# Patient Record
Sex: MALE | Race: WHITE | NOT HISPANIC OR LATINO | Employment: FULL TIME
[De-identification: names, ages, dates, MRNs, and addresses within clinical notes are randomized per-mention and may not be internally consistent; named-entity substitution may affect disease eponyms.]

---

## 2021-01-04 ENCOUNTER — RESULTS ONLY (OUTPATIENT)
Dept: LAB | Age: 46
End: 2021-01-04

## 2021-01-04 LAB
LABORATORY COMMENT REPORT: ABNORMAL
SARS-COV-2 RNA SPEC QL NAA+PROBE: POSITIVE
SPECIMEN SOURCE: ABNORMAL

## 2021-12-29 ENCOUNTER — IMMUNIZATION (OUTPATIENT)
Dept: FAMILY MEDICINE | Facility: OTHER | Age: 46
End: 2021-12-29
Attending: FAMILY MEDICINE

## 2021-12-29 PROCEDURE — 0004A PR COVID VAC PFIZER DIL RECON 30 MCG/0.3 ML IM: CPT

## 2021-12-29 PROCEDURE — 91300 PR COVID VAC PFIZER DIL RECON 30 MCG/0.3 ML IM: CPT

## 2022-05-02 ENCOUNTER — HOSPITAL ENCOUNTER (EMERGENCY)
Facility: HOSPITAL | Age: 47
Discharge: HOME | End: 2022-05-02
Attending: NURSE PRACTITIONER | Admitting: NURSE PRACTITIONER

## 2022-05-02 ENCOUNTER — APPOINTMENT (OUTPATIENT)
Dept: GENERAL RADIOLOGY | Facility: HOSPITAL | Age: 47
End: 2022-05-02
Attending: STUDENT IN AN ORGANIZED HEALTH CARE EDUCATION/TRAINING PROGRAM

## 2022-05-02 VITALS
TEMPERATURE: 97.5 F | DIASTOLIC BLOOD PRESSURE: 93 MMHG | RESPIRATION RATE: 16 BRPM | OXYGEN SATURATION: 97 % | SYSTOLIC BLOOD PRESSURE: 169 MMHG | HEART RATE: 122 BPM

## 2022-05-02 DIAGNOSIS — S61.216A LACERATION OF RIGHT LITTLE FINGER WITHOUT FOREIGN BODY WITHOUT DAMAGE TO NAIL, INITIAL ENCOUNTER: ICD-10-CM

## 2022-05-02 PROCEDURE — 12001 RPR S/N/AX/GEN/TRNK 2.5CM/<: CPT | Performed by: NURSE PRACTITIONER

## 2022-05-02 PROCEDURE — 90715 TDAP VACCINE 7 YRS/> IM: CPT | Performed by: NURSE PRACTITIONER

## 2022-05-02 PROCEDURE — 999N000104 HC STATISTIC NO CHARGE

## 2022-05-02 PROCEDURE — 73140 X-RAY EXAM OF FINGER(S): CPT | Mod: RT

## 2022-05-02 PROCEDURE — 250N000011 HC RX IP 250 OP 636: Performed by: NURSE PRACTITIONER

## 2022-05-02 PROCEDURE — 90471 IMMUNIZATION ADMIN: CPT | Performed by: NURSE PRACTITIONER

## 2022-05-02 PROCEDURE — 12001 RPR S/N/AX/GEN/TRNK 2.5CM/<: CPT

## 2022-05-02 RX ADMIN — CLOSTRIDIUM TETANI TOXOID ANTIGEN (FORMALDEHYDE INACTIVATED), CORYNEBACTERIUM DIPHTHERIAE TOXOID ANTIGEN (FORMALDEHYDE INACTIVATED), BORDETELLA PERTUSSIS TOXOID ANTIGEN (GLUTARALDEHYDE INACTIVATED), BORDETELLA PERTUSSIS FILAMENTOUS HEMAGGLUTININ ANTIGEN (FORMALDEHYDE INACTIVATED), BORDETELLA PERTUSSIS PERTACTIN ANTIGEN, AND BORDETELLA PERTUSSIS FIMBRIAE 2/3 ANTIGEN 0.5 ML: 5; 2; 2.5; 5; 3; 5 INJECTION, SUSPENSION INTRAMUSCULAR at 11:57

## 2022-05-02 ASSESSMENT — ENCOUNTER SYMPTOMS
NUMBNESS: 1
WOUND: 1
NAUSEA: 0
COLOR CHANGE: 0
FEVER: 0
ACTIVITY CHANGE: 1
VOMITING: 0
CHILLS: 0

## 2022-05-02 NOTE — DISCHARGE INSTRUCTIONS
Apply bacitracin and do twice daily dressing changes for the next 48 to 72 hours. You may shower but do not saturate the wound. If you have increased pain, redness at wound site, fevers, or abnormal drainage (purulent/pus) you need to see your primary care provider or return to Urgent Care/ER immediately. Acetaminophen/tylenol  or ibuprofen for pain.  Suture removal in seven to ten days.

## 2022-05-02 NOTE — ED PROVIDER NOTES
History     Chief Complaint   Patient presents with     Laceration     HPI  Eris Jimenez is a 46 year old male who presents with a right little finger injury that occurred last evening when a truck could fell onto his middle finger.  Accompanied with numbness distal to laceration.  Right-hand-dominant.  No OTC medications have been taken or home interventions applied.  Denies previous injuries to that finger  Denies fevers, chills, nausea, and vomiting.    Musculoskeletal problem/pain  Six cutures      Duration: today    Description  Location: right little finger    Intensity:  2/10    Accompanying signs and symptoms: numbness    History  Previous similar problem: no   Previous evaluation:  none    Precipitating or alleviating factors:  Trauma or overuse: YES- sena of truck fell on finger. Was being held up by a stick   Aggravating factors include: hurt to bend    Therapies tried and outcome: nothing     Allergies:  No Known Allergies    Problem List:    There are no problems to display for this patient.       Past Medical History:    History reviewed. No pertinent past medical history.    Past Surgical History:    History reviewed. No pertinent surgical history.    Family History:    History reviewed. No pertinent family history.    Social History:  Marital Status:   [2]        Medications:    No current outpatient medications on file.        Review of Systems   Constitutional: Positive for activity change. Negative for chills and fever.   Gastrointestinal: Negative for nausea and vomiting.   Skin: Positive for wound (right little finger laceration). Negative for color change.   Neurological: Positive for numbness (distal to laceration).       Physical Exam   BP: 169/93  Pulse: (!) 122  Temp: 97.5  F (36.4  C)  Resp: 16  SpO2: 97 %      Physical Exam  Vitals and nursing note reviewed.   Constitutional:       General: He is in acute distress (mild).      Appearance: He is normal weight.    Cardiovascular:      Rate and Rhythm: Normal rate.   Pulmonary:      Effort: Pulmonary effort is normal.   Musculoskeletal:         General: Swelling (very mild), tenderness and signs of injury present.      Right hand: Swelling (very mild) and laceration present. No tenderness. Normal range of motion. Normal capillary refill. Normal pulse.        Hands:       Comments: right little finger     Skin:     General: Skin is warm and dry.      Capillary Refill: Capillary refill takes less than 2 seconds.      Findings: No bruising or erythema.   Neurological:      Mental Status: He is alert and oriented to person, place, and time.   Psychiatric:         Behavior: Behavior normal.         ED Course                 [facility]    -Laceration Repair    Date/Time: 5/3/2022 7:44 PM  Performed by: Mile Caal CNP  Authorized by: Mile Caal CNP     Risks, benefits and alternatives discussed.      ANESTHESIA (see MAR for exact dosages):     Anesthesia method:  Local infiltration    Local anesthetic:  Lidocaine 1% w/o epi (2 ml)  LACERATION DETAILS     Location:  Finger    Finger location:  R small finger    Length (cm):  1.5    Depth (mm):  2    REPAIR TYPE:     Repair type:  Simple      EXPLORATION:     Hemostasis achieved with:  Direct pressure    Wound exploration: wound explored through full range of motion and entire depth of wound probed and visualized      Wound extent: areolar tissue not violated, fascia not violated, no foreign body, no signs of injury, no nerve damage, no tendon damage, no underlying fracture and no vascular damage      Contaminated: no      TREATMENT:     Area cleansed with:  Saline    Amount of cleaning:  Standard    Irrigation solution:  Sterile saline    Irrigation volume:  10    Irrigation method:  Syringe    Visualized foreign bodies/material removed: no      SKIN REPAIR     Repair method:  Sutures    Suture size:  4-0    Suture material:  Nylon    Suture  technique:  Simple interrupted    Number of sutures:  6    APPROXIMATION     Approximation:  Close    POST-PROCEDURE DETAILS     Dressing:  Antibiotic ointment and non-adherent dressing        PROCEDURE  Describe Procedure: Wound soaked in Hibiclens and water. Injected with 2 ml of 1% lidocaine without epi. Irrigated with 10 ml of NS.  six sutures of 4.0 nylon placed in 1.5 cm V-shaped x 2 mm x 2 mm laceration. Triple antibiotic and dressing applied. Neurovascular status intact before and after procedure.               No results found for this or any previous visit (from the past 24 hour(s)).    Medications   Tdap (tetanus-diphtheria-acell pertussis) (ADACEL) injection 0.5 mL (0.5 mLs Intramuscular Given 5/2/22 6317)       Assessments & Plan (with Medical Decision Making)     I have reviewed the nursing notes.    I have reviewed the findings, diagnosis, plan and need for follow up with the patient.  (S6.060X) Laceration of right little finger without foreign body without damage to nail, initial encounter  Comment: 46 year old male who presents with a right little finger injury that occurred last evening when a truck could fell onto his middle finger.  Accompanied with numbness distal to laceration.  Right-hand-dominant.  No OTC medications have been taken or home interventions applied.  Denies previous injuries to that finger  Denies fevers, chills, nausea, and vomiting.    MDM:1.5 cm V shaped laceration by 2 mm x 2 mm.  Able to completely bend his little finger.    Right little finger x-ray reviewed and per radiology; normal right fifth finger    See procedure note for repair    Plan: Education provided for laceration.  Apply bacitracin and do twice daily dressing changes for the next 48 to 72 hours. You may shower but do not saturate the wound. If you have increased pain, redness at wound site, fevers, or abnormal drainage (purulent/pus) you need to see your primary care provider or return to Urgent Care/ER  immediately. Acetaminophen/tylenol  or ibuprofen for pain.  Suture removal in seven to ten days.   These discharge instructions and medications were reviewed with him and understanding verbalized.    This document was prepared using a combination of typing and voice generated software.  While every attempt was made for accuracy, spelling and grammatical errors may exist.    There are no discharge medications for this patient.      Final diagnoses:   Laceration of right little finger without foreign body without damage to nail, initial encounter       5/2/2022   [facility]       Mile Caal, CNP  05/03/22 2004

## 2022-05-02 NOTE — ED TRIAGE NOTES
"Patient presents with an injury to his right little finger.  States last night a truck sena slammed shut on his finger.  There is a \"v\" shaped laceration present to the medial aspect of the finger; middle joint.  CMS intact, bleeding controlled.        "

## 2023-01-31 ENCOUNTER — OFFICE VISIT (OUTPATIENT)
Dept: INTERNAL MEDICINE | Facility: OTHER | Age: 48
End: 2023-01-31
Attending: STUDENT IN AN ORGANIZED HEALTH CARE EDUCATION/TRAINING PROGRAM
Payer: COMMERCIAL

## 2023-01-31 ENCOUNTER — LAB (OUTPATIENT)
Dept: INTERNAL MEDICINE | Facility: OTHER | Age: 48
End: 2023-01-31

## 2023-01-31 VITALS
RESPIRATION RATE: 16 BRPM | WEIGHT: 187.4 LBS | HEART RATE: 81 BPM | HEIGHT: 68 IN | DIASTOLIC BLOOD PRESSURE: 72 MMHG | BODY MASS INDEX: 28.4 KG/M2 | TEMPERATURE: 97.8 F | OXYGEN SATURATION: 97 % | SYSTOLIC BLOOD PRESSURE: 124 MMHG

## 2023-01-31 DIAGNOSIS — Z00.00 ANNUAL PHYSICAL EXAM: Primary | ICD-10-CM

## 2023-01-31 DIAGNOSIS — I10 PRIMARY HYPERTENSION: ICD-10-CM

## 2023-01-31 DIAGNOSIS — Z12.11 SPECIAL SCREENING FOR MALIGNANT NEOPLASMS, COLON: ICD-10-CM

## 2023-01-31 DIAGNOSIS — E78.2 MIXED HYPERLIPIDEMIA: ICD-10-CM

## 2023-01-31 DIAGNOSIS — Z11.4 SCREENING FOR HIV WITHOUT PRESENCE OF RISK FACTORS: ICD-10-CM

## 2023-01-31 DIAGNOSIS — H90.3 ASYMMETRICAL SENSORINEURAL HEARING LOSS: ICD-10-CM

## 2023-01-31 DIAGNOSIS — Z11.59 NEED FOR HEPATITIS C SCREENING TEST: ICD-10-CM

## 2023-01-31 DIAGNOSIS — Z12.5 ENCOUNTER FOR SCREENING FOR MALIGNANT NEOPLASM OF PROSTATE: ICD-10-CM

## 2023-01-31 PROBLEM — R74.8 LOW SERUM HDL: Status: ACTIVE | Noted: 2023-01-31

## 2023-01-31 LAB
ALBUMIN SERPL BCG-MCNC: 4.7 G/DL (ref 3.5–5.2)
ALP SERPL-CCNC: 62 U/L (ref 40–129)
ALT SERPL W P-5'-P-CCNC: 72 U/L (ref 10–50)
ANION GAP SERPL CALCULATED.3IONS-SCNC: 10 MMOL/L (ref 7–15)
AST SERPL W P-5'-P-CCNC: 40 U/L (ref 10–50)
BILIRUB SERPL-MCNC: 0.6 MG/DL
BUN SERPL-MCNC: 16.3 MG/DL (ref 6–20)
CALCIUM SERPL-MCNC: 9.3 MG/DL (ref 8.6–10)
CHLORIDE SERPL-SCNC: 104 MMOL/L (ref 98–107)
CHOLEST SERPL-MCNC: 165 MG/DL
CREAT SERPL-MCNC: 1.01 MG/DL (ref 0.67–1.17)
DEPRECATED HCO3 PLAS-SCNC: 25 MMOL/L (ref 22–29)
ERYTHROCYTE [DISTWIDTH] IN BLOOD BY AUTOMATED COUNT: 11.8 % (ref 10–15)
GFR SERPL CREATININE-BSD FRML MDRD: >90 ML/MIN/1.73M2
GLUCOSE SERPL-MCNC: 99 MG/DL (ref 70–99)
HCT VFR BLD AUTO: 45.4 % (ref 40–53)
HDLC SERPL-MCNC: 36 MG/DL
HGB BLD-MCNC: 15.9 G/DL (ref 13.3–17.7)
LDLC SERPL CALC-MCNC: 93 MG/DL
MCH RBC QN AUTO: 30.3 PG (ref 26.5–33)
MCHC RBC AUTO-ENTMCNC: 35 G/DL (ref 31.5–36.5)
MCV RBC AUTO: 87 FL (ref 78–100)
NONHDLC SERPL-MCNC: 129 MG/DL
PLATELET # BLD AUTO: 287 10E3/UL (ref 150–450)
POTASSIUM SERPL-SCNC: 4.5 MMOL/L (ref 3.4–5.3)
PROT SERPL-MCNC: 7.2 G/DL (ref 6.4–8.3)
PSA SERPL-MCNC: 0.56 NG/ML (ref 0–2.5)
RBC # BLD AUTO: 5.24 10E6/UL (ref 4.4–5.9)
SODIUM SERPL-SCNC: 139 MMOL/L (ref 136–145)
TRIGL SERPL-MCNC: 178 MG/DL
WBC # BLD AUTO: 8.4 10E3/UL (ref 4–11)

## 2023-01-31 PROCEDURE — 99386 PREV VISIT NEW AGE 40-64: CPT | Performed by: STUDENT IN AN ORGANIZED HEALTH CARE EDUCATION/TRAINING PROGRAM

## 2023-01-31 PROCEDURE — 36415 COLL VENOUS BLD VENIPUNCTURE: CPT | Mod: ZL | Performed by: STUDENT IN AN ORGANIZED HEALTH CARE EDUCATION/TRAINING PROGRAM

## 2023-01-31 PROCEDURE — 86803 HEPATITIS C AB TEST: CPT | Mod: ZL | Performed by: STUDENT IN AN ORGANIZED HEALTH CARE EDUCATION/TRAINING PROGRAM

## 2023-01-31 PROCEDURE — 85027 COMPLETE CBC AUTOMATED: CPT | Mod: ZL | Performed by: STUDENT IN AN ORGANIZED HEALTH CARE EDUCATION/TRAINING PROGRAM

## 2023-01-31 PROCEDURE — 80061 LIPID PANEL: CPT | Mod: ZL | Performed by: STUDENT IN AN ORGANIZED HEALTH CARE EDUCATION/TRAINING PROGRAM

## 2023-01-31 PROCEDURE — G0103 PSA SCREENING: HCPCS | Mod: ZL | Performed by: STUDENT IN AN ORGANIZED HEALTH CARE EDUCATION/TRAINING PROGRAM

## 2023-01-31 PROCEDURE — 87389 HIV-1 AG W/HIV-1&-2 AB AG IA: CPT | Mod: ZL | Performed by: STUDENT IN AN ORGANIZED HEALTH CARE EDUCATION/TRAINING PROGRAM

## 2023-01-31 PROCEDURE — 80053 COMPREHEN METABOLIC PANEL: CPT | Mod: ZL | Performed by: STUDENT IN AN ORGANIZED HEALTH CARE EDUCATION/TRAINING PROGRAM

## 2023-01-31 RX ORDER — ROSUVASTATIN CALCIUM 10 MG/1
1 TABLET, COATED ORAL
COMMUNITY
Start: 2023-01-08 | End: 2023-01-31

## 2023-01-31 RX ORDER — ROSUVASTATIN CALCIUM 10 MG/1
10 TABLET, COATED ORAL
Qty: 90 TABLET | Refills: 3 | Status: SHIPPED | OUTPATIENT
Start: 2023-01-31 | End: 2024-01-24

## 2023-01-31 RX ORDER — ROSUVASTATIN CALCIUM 10 MG/1
TABLET, COATED ORAL
COMMUNITY
Start: 2022-09-21 | End: 2023-01-31

## 2023-01-31 RX ORDER — LISINOPRIL 10 MG/1
10 TABLET ORAL
Qty: 90 TABLET | Refills: 4 | Status: SHIPPED | OUTPATIENT
Start: 2023-01-31 | End: 2024-01-24

## 2023-01-31 RX ORDER — LISINOPRIL 10 MG/1
TABLET ORAL
COMMUNITY
Start: 2022-09-21 | End: 2023-01-31

## 2023-01-31 RX ORDER — PENICILLIN V POTASSIUM 500 MG/1
TABLET, FILM COATED ORAL
COMMUNITY
Start: 2022-03-29 | End: 2023-01-31

## 2023-01-31 RX ORDER — LISINOPRIL 10 MG/1
1 TABLET ORAL
COMMUNITY
Start: 2023-01-08 | End: 2023-01-31

## 2023-01-31 ASSESSMENT — ENCOUNTER SYMPTOMS
WEAKNESS: 0
HEARTBURN: 0
PARESTHESIAS: 0
SORE THROAT: 0
PALPITATIONS: 0
FEVER: 0
CHILLS: 0
HEMATOCHEZIA: 0
DYSURIA: 0
DIZZINESS: 0
NERVOUS/ANXIOUS: 0
JOINT SWELLING: 0
FREQUENCY: 0
NAUSEA: 0
HEMATURIA: 0
ARTHRALGIAS: 0
SHORTNESS OF BREATH: 0
ABDOMINAL PAIN: 0
CONSTIPATION: 0
MYALGIAS: 0
EYE PAIN: 0
HEADACHES: 0
DIARRHEA: 0
COUGH: 0

## 2023-01-31 ASSESSMENT — PAIN SCALES - GENERAL: PAINLEVEL: NO PAIN (0)

## 2023-01-31 NOTE — LETTER
January 31, 2023      AMBROSE Jimenez   [address]  [address] [place] [address]        Dear ,      We are writing to inform you of your test results.    It was great to see you today. Your lab results are as below. You screened negative for prostate cancer, your CMP was remarkable for a minimally isolated ALT. I don't know what to make of that, but we will recheck it in one year at your physical. Your blood counts are normal and your Lipid panel looks pretty good. No changes to your crestor. See you in a year, sooner if issues. Let me know if you have any questions.     Resulted Orders   PSA Screen GH   Result Value Ref Range    Prostate Specific Antigen Screen 0.56 0.00 - 2.50 ng/mL    Narrative    This result is obtained using the Roche Elecsys total PSA method on the josé manuel e601 immunoassay analyzer. Results obtained with different assay methods or kits cannot be used interchangeably.   Comprehensive Metabolic Panel   Result Value Ref Range    Sodium 139 136 - 145 mmol/L    Potassium 4.5 3.4 - 5.3 mmol/L    Chloride 104 98 - 107 mmol/L    Carbon Dioxide (CO2) 25 22 - 29 mmol/L    Anion Gap 10 7 - 15 mmol/L    Urea Nitrogen 16.3 6.0 - 20.0 mg/dL    Creatinine 1.01 0.67 - 1.17 mg/dL    Calcium 9.3 8.6 - 10.0 mg/dL    Glucose 99 70 - 99 mg/dL    Alkaline Phosphatase 62 40 - 129 U/L    AST 40 10 - 50 U/L    ALT 72 (H) 10 - 50 U/L    Protein Total 7.2 6.4 - 8.3 g/dL    Albumin 4.7 3.5 - 5.2 g/dL    Bilirubin Total 0.6 <=1.2 mg/dL    GFR Estimate >90 >60 mL/min/1.73m2      Comment:      eGFR calculated using 2021 CKD-EPI equation.   CBC W PLT No Diff   Result Value Ref Range    WBC Count 8.4 4.0 - 11.0 10e3/uL    RBC Count 5.24 4.40 - 5.90 10e6/uL    Hemoglobin 15.9 13.3 - 17.7 g/dL    Hematocrit 45.4 40.0 - 53.0 %    MCV 87 78 - 100 fL    MCH 30.3 26.5 - 33.0 pg    MCHC 35.0 31.5 - 36.5 g/dL    RDW 11.8 10.0 - 15.0 %    Platelet Count 287 150 - 450 10e3/uL   Lipid Panel   Result Value Ref Range    Cholesterol  165 <200 mg/dL    Triglycerides 178 (H) <150 mg/dL    Direct Measure HDL 36 (L) >=40 mg/dL    LDL Cholesterol Calculated 93 <=100 mg/dL    Non HDL Cholesterol 129 <130 mg/dL    Narrative    Cholesterol  Desirable:  <200 mg/dL    Triglycerides  Normal:  Less than 150 mg/dL  Borderline High:  150-199 mg/dL  High:  200-499 mg/dL  Very High:  Greater than or equal to 500 mg/dL    Direct Measure HDL  Female:  Greater than or equal to 50 mg/dL   Male:  Greater than or equal to 40 mg/dL    LDL Cholesterol  Desirable:  <100mg/dL  Above Desirable:  100-129 mg/dL   Borderline High:  130-159 mg/dL   High:  160-189 mg/dL   Very High:  >= 190 mg/dL    Non HDL Cholesterol  Desirable:  130 mg/dL  Above Desirable:  130-159 mg/dL  Borderline High:  160-189 mg/dL  High:  190-219 mg/dL  Very High:  Greater than or equal to 220 mg/dL       If you have any questions or concerns, please call the clinic at the number listed above.       Sincerely,      Robert He MD

## 2023-01-31 NOTE — PROGRESS NOTES
SUBJECTIVE:   CC: AMBROSE is an 47 year old who presents for preventative health visit.   Patient has been advised of split billing requirements and indicates understanding: Yes  Healthy Habits:     Getting at least 3 servings of Calcium per day:  Yes    Bi-annual eye exam:  NO    Dental care twice a year:  Yes    Sleep apnea or symptoms of sleep apnea:  None    Diet:  Regular (no restrictions)    Frequency of exercise:  2-3 days/week    Duration of exercise:  45-60 minutes    Taking medications regularly:  Yes    Medication side effects:  None    PHQ-2 Total Score: 0    Additional concerns today:  No      No concerns today he is establishing care from [facility] in [place].  His former primary care doctor is retiring.  He is essentially on rosuvastatin and lisinopril.  Recently started on lisinopril due to diastolic hypertension.  No complaints today.  Doing well.  He is exercising at the gym approximately 4 times per week.  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  Updated past medical history surgical history and social history.        Today's PHQ-2 Score:   PHQ-2 ( 1999 Pfizer) 1/31/2023   Q1: Little interest or pleasure in doing things 0   Q2: Feeling down, depressed or hopeless 0   PHQ-2 Score 0   Q1: Little interest or pleasure in doing things Not at all   Q2: Feeling down, depressed or hopeless Not at all   PHQ-2 Score 0       Have you ever done Advance Care Planning? (For example, a Health Directive, POLST, or a discussion with a medical provider or your loved ones about your wishes): No, advance care planning information given to patient to review.  Patient declined advance care planning discussion at this time.    Social History     Tobacco Use     Smoking status: Never     Smokeless tobacco: Never   Substance Use Topics     Alcohol use: Yes     Comment: very rarely     If you drink alcohol do you typically have >3 drinks per day or >7 drinks per week? No    Alcohol Use 1/31/2023  "  Prescreen: >3 drinks/day or >7 drinks/week? No       Last PSA: No results found for: PSA    Reviewed orders with patient. Reviewed health maintenance and updated orders accordingly - Yes  Lab work is in process    Reviewed and updated as needed this visit by clinical staff   Tobacco  Allergies  Meds      Soc Hx        Reviewed and updated as needed this visit by Provider                 Past Medical History:   Diagnosis Date     HTN (hypertension)       Past Surgical History:   Procedure Laterality Date     wisdom teeth      Age 19       Review of Systems  CONSTITUTIONAL: NEGATIVE for fever, chills, change in weight  INTEGUMENTARY/SKIN: NEGATIVE for worrisome rashes, moles or lesions  EYES: NEGATIVE for vision changes or irritation  ENT: NEGATIVE for ear, mouth and throat problems  RESP: NEGATIVE for significant cough or SOB  CV: NEGATIVE for chest pain, palpitations or peripheral edema  GI: NEGATIVE for nausea, abdominal pain, heartburn, or change in bowel habits   male: negative for dysuria, hematuria, decreased urinary stream, erectile dysfunction, urethral discharge  MUSCULOSKELETAL: NEGATIVE for significant arthralgias or myalgia  NEURO: NEGATIVE for weakness, dizziness or paresthesias  PSYCHIATRIC: NEGATIVE for changes in mood or affect    OBJECTIVE:   /72 (BP Location: Right arm, Patient Position: Sitting, Cuff Size: Adult Regular)   Pulse 81   Temp 97.8  F (36.6  C) (Temporal)   Resp 16   Ht 1.727 m (5' 8\")   Wt 85 kg (187 lb 6.4 oz)   SpO2 97%   BMI 28.49 kg/m      Physical Exam  Vitals and nursing note reviewed.   Constitutional:       General: He is not in acute distress.     Appearance: He is well-developed. He is not diaphoretic.   HENT:      Head: Normocephalic and atraumatic.      Right Ear: Tympanic membrane, ear canal and external ear normal.      Left Ear: Tympanic membrane, ear canal and external ear normal.      Mouth/Throat:      Mouth: Mucous membranes are moist.      " Pharynx: Oropharynx is clear.   Eyes:      General: No scleral icterus.     Conjunctiva/sclera: Conjunctivae normal.   Cardiovascular:      Rate and Rhythm: Normal rate and regular rhythm.      Heart sounds: No murmur heard.  Pulmonary:      Effort: Pulmonary effort is normal.      Breath sounds: Normal breath sounds. No wheezing.   Abdominal:      Palpations: Abdomen is soft. There is no mass.      Tenderness: There is no abdominal tenderness.   Musculoskeletal:         General: No deformity.      Cervical back: Neck supple.   Lymphadenopathy:      Cervical: No cervical adenopathy.   Skin:     General: Skin is warm and dry.      Coloration: Skin is not jaundiced.      Findings: No rash.   Neurological:      Mental Status: He is alert and oriented to person, place, and time. Mental status is at baseline.   Psychiatric:         Mood and Affect: Mood normal.         Behavior: Behavior normal.         Thought Content: Thought content normal.           Diagnostic Test Results:  Labs reviewed in Epic    ASSESSMENT/PLAN:       ICD-10-CM    1. Annual physical exam  Z00.00 Comprehensive Metabolic Panel     CBC W PLT No Diff     Comprehensive Metabolic Panel     CBC W PLT No Diff      2. Special screening for malignant neoplasms, colon  Z12.11 COLOGUARD(EXACT SCIENCES)      3. Primary hypertension  I10 lisinopril (ZESTRIL) 10 MG tablet      4. Mixed hyperlipidemia  E78.2 rosuvastatin (CRESTOR) 10 MG tablet     Lipid Panel     Lipid Panel      5. Asymmetrical sensorineural hearing loss  H90.3       6. Encounter for screening for malignant neoplasm of prostate  Z12.5 PSA Screen GH     PSA Screen GH      7. Screening for HIV without presence of risk factors  Z11.4 HIV Antigen Antibody Combo     HIV Antigen Antibody Combo      8. Need for hepatitis C screening test  Z11.59 Hepatitis C Screen Reflex to HCV RNA Quant and Genotype     Hepatitis C Screen Reflex to HCV RNA Quant and Genotype        Annual physical exam: Patient is  "establishing care from [facility] in [place].  Reviewed and updated his past medical history surgical history and social history.  He is on rosuvastatin and lisinopril and I have represcribed both of these medications for him for his hypertension and well-controlled hyperlipidemia.  He has not had a colonoscopy before.  We discussed obtaining a colonoscopy at age 50 and performing a Cologuard testing this year.  We will obtain basic labs as he has recently started his lisinopril to ensure he does not have hyperkalemia.  Also screen one-time for PSA HIV and hepatitis C.  Blood pressure under good control today.  Follow-up in 1 year for repeat annual exam.  Up-to-date on all vaccinations.        COUNSELING:   Reviewed preventive health counseling, as reflected in patient instructions       Regular exercise       Consider Hep C screening for all patients one time for ages 18-79 years       HIV screeninx in teen years, 1x in adult years, and at intervals if high risk       Colorectal cancer screening       Prostate cancer screening      BMI:   Estimated body mass index is 28.49 kg/m  as calculated from the following:    Height as of this encounter: 1.727 m (5' 8\").    Weight as of this encounter: 85 kg (187 lb 6.4 oz).   Weight management plan: Discussed healthy diet and exercise guidelines      He reports that he has never smoked. He has never used smokeless tobacco.        Robert He MD  [facility]  "

## 2023-01-31 NOTE — NURSING NOTE
"Chief Complaint   Patient presents with     Physical     Blood pressure and est care       Medication reconciliation completed.    FOOD SECURITY SCREENING QUESTIONS:    The next two questions are to help us understand your food security.  If you are feeling you need any assistance in this area, we have resources available to support you today.    Hunger Vital Signs:  Within the past 12 months we worried whether our food would run out before we got money to buy more. Never  Within the past 12 months the food we bought just didn't last and we didn't have money to get more. Never    Initial /72 (BP Location: Right arm, Patient Position: Sitting, Cuff Size: Adult Regular)   Pulse 81   Temp 97.8  F (36.6  C) (Temporal)   Resp 16   Ht 1.727 m (5' 8\")   Wt 85 kg (187 lb 6.4 oz)   SpO2 97%   BMI 28.49 kg/m   Estimated body mass index is 28.49 kg/m  as calculated from the following:    Height as of this encounter: 1.727 m (5' 8\").    Weight as of this encounter: 85 kg (187 lb 6.4 oz).       Anca Gaxiola LPN .......  1/31/2023  1:32 PM  "

## 2023-02-02 LAB
HCV AB SERPL QL IA: NONREACTIVE
HIV 1+2 AB+HIV1 P24 AG SERPL QL IA: NONREACTIVE

## 2023-02-15 LAB — NONINV COLON CA DNA+OCC BLD SCRN STL QL: NEGATIVE

## 2024-01-24 ENCOUNTER — OFFICE VISIT (OUTPATIENT)
Dept: INTERNAL MEDICINE | Facility: OTHER | Age: 49
End: 2024-01-24
Attending: STUDENT IN AN ORGANIZED HEALTH CARE EDUCATION/TRAINING PROGRAM
Payer: COMMERCIAL

## 2024-01-24 VITALS
OXYGEN SATURATION: 98 % | HEIGHT: 68 IN | WEIGHT: 192.2 LBS | RESPIRATION RATE: 16 BRPM | SYSTOLIC BLOOD PRESSURE: 122 MMHG | BODY MASS INDEX: 29.13 KG/M2 | DIASTOLIC BLOOD PRESSURE: 86 MMHG | TEMPERATURE: 97.9 F | HEART RATE: 77 BPM

## 2024-01-24 DIAGNOSIS — F43.21 GRIEF: ICD-10-CM

## 2024-01-24 DIAGNOSIS — I10 PRIMARY HYPERTENSION: ICD-10-CM

## 2024-01-24 DIAGNOSIS — E78.2 MIXED HYPERLIPIDEMIA: ICD-10-CM

## 2024-01-24 DIAGNOSIS — Z12.5 ENCOUNTER FOR SCREENING FOR MALIGNANT NEOPLASM OF PROSTATE: ICD-10-CM

## 2024-01-24 DIAGNOSIS — R79.89 ABNORMAL LFTS: ICD-10-CM

## 2024-01-24 DIAGNOSIS — Z00.00 ANNUAL PHYSICAL EXAM: Primary | ICD-10-CM

## 2024-01-24 LAB
ALBUMIN SERPL BCG-MCNC: 4.8 G/DL (ref 3.5–5.2)
ALP SERPL-CCNC: 61 U/L (ref 40–150)
ALT SERPL W P-5'-P-CCNC: 57 U/L (ref 0–70)
ANION GAP SERPL CALCULATED.3IONS-SCNC: 9 MMOL/L (ref 7–15)
AST SERPL W P-5'-P-CCNC: 37 U/L (ref 0–45)
BILIRUB SERPL-MCNC: 0.4 MG/DL
BUN SERPL-MCNC: 12.3 MG/DL (ref 6–20)
CALCIUM SERPL-MCNC: 9.5 MG/DL (ref 8.6–10)
CHLORIDE SERPL-SCNC: 105 MMOL/L (ref 98–107)
CHOLEST SERPL-MCNC: 171 MG/DL
CREAT SERPL-MCNC: 1.04 MG/DL (ref 0.67–1.17)
DEPRECATED HCO3 PLAS-SCNC: 28 MMOL/L (ref 22–29)
EGFRCR SERPLBLD CKD-EPI 2021: 89 ML/MIN/1.73M2
ERYTHROCYTE [DISTWIDTH] IN BLOOD BY AUTOMATED COUNT: 12 % (ref 10–15)
FASTING STATUS PATIENT QL REPORTED: YES
GLUCOSE SERPL-MCNC: 97 MG/DL (ref 70–99)
HCT VFR BLD AUTO: 44.6 % (ref 40–53)
HDLC SERPL-MCNC: 34 MG/DL
HGB BLD-MCNC: 15.3 G/DL (ref 13.3–17.7)
LDLC SERPL CALC-MCNC: 91 MG/DL
MCH RBC QN AUTO: 29.8 PG (ref 26.5–33)
MCHC RBC AUTO-ENTMCNC: 34.3 G/DL (ref 31.5–36.5)
MCV RBC AUTO: 87 FL (ref 78–100)
NONHDLC SERPL-MCNC: 137 MG/DL
PLATELET # BLD AUTO: 300 10E3/UL (ref 150–450)
POTASSIUM SERPL-SCNC: 4.5 MMOL/L (ref 3.4–5.3)
PROT SERPL-MCNC: 7.3 G/DL (ref 6.4–8.3)
PSA SERPL DL<=0.01 NG/ML-MCNC: 0.51 NG/ML (ref 0–2.5)
RBC # BLD AUTO: 5.13 10E6/UL (ref 4.4–5.9)
SODIUM SERPL-SCNC: 142 MMOL/L (ref 135–145)
TRIGL SERPL-MCNC: 228 MG/DL
WBC # BLD AUTO: 9.8 10E3/UL (ref 4–11)

## 2024-01-24 PROCEDURE — G0103 PSA SCREENING: HCPCS | Mod: ZL | Performed by: STUDENT IN AN ORGANIZED HEALTH CARE EDUCATION/TRAINING PROGRAM

## 2024-01-24 PROCEDURE — 80053 COMPREHEN METABOLIC PANEL: CPT | Mod: ZL | Performed by: STUDENT IN AN ORGANIZED HEALTH CARE EDUCATION/TRAINING PROGRAM

## 2024-01-24 PROCEDURE — 99396 PREV VISIT EST AGE 40-64: CPT | Performed by: STUDENT IN AN ORGANIZED HEALTH CARE EDUCATION/TRAINING PROGRAM

## 2024-01-24 PROCEDURE — 36415 COLL VENOUS BLD VENIPUNCTURE: CPT | Mod: ZL | Performed by: STUDENT IN AN ORGANIZED HEALTH CARE EDUCATION/TRAINING PROGRAM

## 2024-01-24 PROCEDURE — 80061 LIPID PANEL: CPT | Mod: ZL | Performed by: STUDENT IN AN ORGANIZED HEALTH CARE EDUCATION/TRAINING PROGRAM

## 2024-01-24 PROCEDURE — 85014 HEMATOCRIT: CPT | Mod: ZL | Performed by: STUDENT IN AN ORGANIZED HEALTH CARE EDUCATION/TRAINING PROGRAM

## 2024-01-24 RX ORDER — LISINOPRIL 10 MG/1
10 TABLET ORAL
Qty: 90 TABLET | Refills: 4 | Status: SHIPPED | OUTPATIENT
Start: 2024-01-24

## 2024-01-24 RX ORDER — ROSUVASTATIN CALCIUM 10 MG/1
10 TABLET, COATED ORAL
Qty: 90 TABLET | Refills: 4 | Status: SHIPPED | OUTPATIENT
Start: 2024-01-24

## 2024-01-24 ASSESSMENT — ENCOUNTER SYMPTOMS
HEMATOCHEZIA: 0
NAUSEA: 0
PARESTHESIAS: 0
WEAKNESS: 0
SHORTNESS OF BREATH: 0
ABDOMINAL PAIN: 0
HEADACHES: 0
CHILLS: 0
PALPITATIONS: 0
HEMATURIA: 0
DIARRHEA: 0
DIZZINESS: 0
MYALGIAS: 0
CONSTIPATION: 0
EYE PAIN: 0
NERVOUS/ANXIOUS: 0
DYSURIA: 0
JOINT SWELLING: 0
FEVER: 0
HEARTBURN: 0
SORE THROAT: 0
ARTHRALGIAS: 0
FREQUENCY: 0
COUGH: 0

## 2024-01-24 ASSESSMENT — PAIN SCALES - GENERAL: PAINLEVEL: NO PAIN (0)

## 2024-01-24 NOTE — PROGRESS NOTES
Preventive Care Visit  [facility] AND [facility]  Robert He MD, Internal Medicine  Jan 24, 2024      SUBJECTIVE:   AMBROSE is a 48 year old, presenting for the following:  Physical        1/24/2024     7:55 AM   Additional Questions   Roomed by Connor Johnson LPN     Healthy Habits:     Getting at least 3 servings of Calcium per day:  Yes    Bi-annual eye exam:  NO    Dental care twice a year:  Yes    Sleep apnea or symptoms of sleep apnea:  None    Diet:  Regular (no restrictions)    Frequency of exercise:  2-3 days/week    Duration of exercise:  45-60 minutes    Taking medications regularly:  Yes    Medication side effects:  None    Additional concerns today:  No      Today's PHQ-2 Score:       1/24/2024    12:26 AM   PHQ-2 ( 1999 Pfizer)   Q1: Little interest or pleasure in doing things 0   Q2: Feeling down, depressed or hopeless 0   PHQ-2 Score 0   Q1: Little interest or pleasure in doing things Not at all   Q2: Feeling down, depressed or hopeless Not at all   PHQ-2 Score 0     Updated past medical history surgical history social medications.    Patient unfortunately lost his son this summer.  He is coping relatively well.  Not feeling down or depressed but showing normal emotions associated with grief.  Recently traveled to [place] with his other son and girlfriend and his wife.  He is still exercising 5 days a week.  Very rare alcohol use.  No other substance abuse.  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  He finally got hearing aids.  He still suffers from tinnitus.  We discussed that there is essentially no treatment for this and that ENT will not offer anything more to him.  I did offer referral to neuro-otology at the [facility] for further evaluation but he declined at this time.    He has been taking his rosuvastatin and lisinopril regularly.  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    No other concerns today.      Social History     Tobacco Use    Smoking status: Never    Smokeless tobacco: Never   Substance Use Topics    Alcohol use: Not Currently     Comment: very rarely             1/24/2024    12:24 AM   Alcohol Use   Prescreen: >3 drinks/day or >7 drinks/week? No       Last PSA:   Prostate Specific Antigen Screen   Date Value Ref Range Status   01/31/2023 0.56 0.00 - 2.50 ng/mL Final       Reviewed orders with patient. Reviewed health maintenance and updated orders accordingly - Yes  Lab work is in process  Labs reviewed in EPIC    Reviewed and updated as needed this visit by clinical staff   Tobacco  Allergies  Meds      Soc Hx        Reviewed and updated as needed this visit by Provider                    Review of Systems   Constitutional:  Negative for chills and fever.   HENT:  Positive for hearing loss. Negative for congestion, ear pain and sore throat.    Eyes:  Negative for pain and visual disturbance.   Respiratory:  Negative for cough and shortness of breath.    Cardiovascular:  Negative for chest pain and palpitations.   Gastrointestinal:  Negative for abdominal pain, constipation, diarrhea and nausea.   Genitourinary:  Negative for dysuria, frequency, genital sores, hematuria, penile discharge and urgency.   Musculoskeletal:  Negative for arthralgias, joint swelling and myalgias.   Skin:  Negative for rash.   Neurological:  Negative for dizziness, weakness and headaches.   Psychiatric/Behavioral:  The patient is not nervous/anxious.          OBJECTIVE:   /86   Pulse 77   Temp 97.9  F (36.6  C) (Temporal)   Resp 16   Ht 1.727 m (5' 8\")   Wt 87.2 kg (192 lb 3.2 oz)   SpO2 98%   BMI 29.22 kg/m     Estimated body mass index is 29.22 kg/m  as calculated from the following:    Height as of this encounter: 1.727 m (5' 8\").    Weight as of this encounter: 87.2 kg (192 lb 3.2 oz).  Physical Exam  Vitals and nursing note reviewed.   Constitutional:       General: He " is not in acute distress.     Appearance: He is well-developed. He is not diaphoretic.   HENT:      Head: Normocephalic and atraumatic.      Right Ear: Tympanic membrane, ear canal and external ear normal.      Left Ear: Tympanic membrane, ear canal and external ear normal.      Mouth/Throat:      Mouth: Mucous membranes are moist.      Pharynx: Oropharynx is clear.   Eyes:      General: No scleral icterus.     Conjunctiva/sclera: Conjunctivae normal.   Cardiovascular:      Rate and Rhythm: Normal rate and regular rhythm.      Heart sounds: No murmur heard.  Pulmonary:      Effort: Pulmonary effort is normal.      Breath sounds: Normal breath sounds. No wheezing.   Abdominal:      Palpations: Abdomen is soft. There is no mass.      Tenderness: There is no abdominal tenderness.   Musculoskeletal:         General: No deformity.      Cervical back: Neck supple.   Lymphadenopathy:      Cervical: No cervical adenopathy.   Skin:     General: Skin is warm and dry.      Coloration: Skin is not jaundiced.      Findings: No rash.   Neurological:      Mental Status: He is alert and oriented to person, place, and time. Mental status is at baseline.   Psychiatric:         Mood and Affect: Mood normal.         Behavior: Behavior normal.         Thought Content: Thought content normal.           Diagnostic Test Results:  Labs reviewed in Epic    ASSESSMENT/PLAN:   Assessment & Plan         ICD-10-CM    1. Annual physical exam  Z00.00 CBC with platelets     Comprehensive metabolic panel     CBC with platelets     Comprehensive metabolic panel      2. Primary hypertension  I10 lisinopril (ZESTRIL) 10 MG tablet      3. Mixed hyperlipidemia  E78.2 rosuvastatin (CRESTOR) 10 MG tablet     Lipid Profile     Lipid Profile      4. Encounter for screening for malignant neoplasm of prostate  Z12.5 Prostate Specific Antigen Screen     Prostate Specific Antigen Screen      5. Grief  F43.21         Annual physical exam: Updated past medical  "history surgical history social history family history and medications.  Reviewed and renewed his lisinopril and rosuvastatin.  He is up-to-date on his vaccinations.  Cologuard was performed in February 2023 and was negative.  Will repeat in 2026.  Obtain basic labs as above.  Blood pressure under good control today.    Grief: Unfortunately lost his son last spring.  He is coping quite well with this and does not show any signs of depression at this time.  He will let me know if he needs to see a counselor etc.    LFT abnormality: Isolated ALT elevation in January 2023.  Minimal alcohol use approximately once per month.  Recheck CMP today shows normal LFTs.  Unsure cause.  No alkaline phosphatase elevation or AST elevation.  Discussed if LFTs are abnormal in the future would hold statin, recheck in 2 to 4 weeks and if still no abnormality perform a right upper quadrant ultrasound but LFTs have returned to normal so we will hold off on this for now.      Patient will follow-up with me in 1 year for annual exam, sooner if needed.  No follow-ups on file.    Robert He MD  [facility]        Counseling  Reviewed preventive health counseling, as reflected in patient instructions      BMI  Estimated body mass index is 29.22 kg/m  as calculated from the following:    Height as of this encounter: 1.727 m (5' 8\").    Weight as of this encounter: 87.2 kg (192 lb 3.2 oz).   Weight management plan: Discussed healthy diet and exercise guidelines      He reports that he has never smoked. He has never used smokeless tobacco.          Signed Electronically by: Robert He MD  Answers submitted by the patient for this visit:  Annual Preventive Visit (Submitted on 1/24/2024)  Chief Complaint: Annual Exam:  Blood in stool: No  heartburn: No  peripheral edema: No  mood changes: No  Skin sensation changes: No  impotence: No    "

## 2024-01-24 NOTE — LETTER
January 24, 2024      AMBROSE Jimenez   [address]  [address] [place] [address]        Dear ,    We are writing to inform you of your test results.    It was great to see you today.  Your laboratory results are below.  Your LFTs have normalized so I do not think we need to hold your statin at this time.  The rest of your kidney function and electrolytes are totally normal as well as your blood counts and PSA.  The only thing slightly abnormal was your cholesterol is on the higher end of the normal range and your triglycerides were minimally elevated.  This is due to you having eaten on your shift.  Nothing to worry about there.  Continue your lisinopril and rosuvastatin without change.  I will see you in another year for a annual wellness exam, sooner if anything else comes up.    Resulted Orders   Lipid Profile   Result Value Ref Range    Cholesterol 171 <200 mg/dL    Triglycerides 228 (H) <150 mg/dL    Direct Measure HDL 34 (L) >=40 mg/dL    LDL Cholesterol Calculated 91 <=100 mg/dL    Non HDL Cholesterol 137 (H) <130 mg/dL    Patient Fasting > 8hrs? Yes     Narrative    Cholesterol  Desirable:  <200 mg/dL    Triglycerides  Normal:  Less than 150 mg/dL  Borderline High:  150-199 mg/dL  High:  200-499 mg/dL  Very High:  Greater than or equal to 500 mg/dL    Direct Measure HDL  Female:  Greater than or equal to 50 mg/dL   Male:  Greater than or equal to 40 mg/dL    LDL Cholesterol  Desirable:  <100mg/dL  Above Desirable:  100-129 mg/dL   Borderline High:  130-159 mg/dL   High:  160-189 mg/dL   Very High:  >= 190 mg/dL    Non HDL Cholesterol  Desirable:  130 mg/dL  Above Desirable:  130-159 mg/dL  Borderline High:  160-189 mg/dL  High:  190-219 mg/dL  Very High:  Greater than or equal to 220 mg/dL   Prostate Specific Antigen Screen   Result Value Ref Range    Prostate Specific Antigen Screen 0.51 0.00 - 2.50 ng/mL    Narrative    This result is obtained using the Roche Elecsys total PSA method on the josé manuel  e601 immunoassay analyzer. Results obtained with different assay methods or kits cannot be used interchangeably.   CBC with platelets   Result Value Ref Range    WBC Count 9.8 4.0 - 11.0 10e3/uL    RBC Count 5.13 4.40 - 5.90 10e6/uL    Hemoglobin 15.3 13.3 - 17.7 g/dL    Hematocrit 44.6 40.0 - 53.0 %    MCV 87 78 - 100 fL    MCH 29.8 26.5 - 33.0 pg    MCHC 34.3 31.5 - 36.5 g/dL    RDW 12.0 10.0 - 15.0 %    Platelet Count 300 150 - 450 10e3/uL   Comprehensive metabolic panel   Result Value Ref Range    Sodium 142 135 - 145 mmol/L      Comment:      Reference intervals for this test were updated on 09/26/2023 to more accurately reflect our healthy population. There may be differences in the flagging of prior results with similar values performed with this method. Interpretation of those prior results can be made in the context of the updated reference intervals.     Potassium 4.5 3.4 - 5.3 mmol/L    Carbon Dioxide (CO2) 28 22 - 29 mmol/L    Anion Gap 9 7 - 15 mmol/L    Urea Nitrogen 12.3 6.0 - 20.0 mg/dL    Creatinine 1.04 0.67 - 1.17 mg/dL    GFR Estimate 89 >60 mL/min/1.73m2    Calcium 9.5 8.6 - 10.0 mg/dL    Chloride 105 98 - 107 mmol/L    Glucose 97 70 - 99 mg/dL    Alkaline Phosphatase 61 40 - 150 U/L      Comment:      Reference intervals for this test were updated on 11/14/2023 to more accurately reflect our healthy population. There may be differences in the flagging of prior results with similar values performed with this method. Interpretation of those prior results can be made in the context of the updated reference intervals.    AST 37 0 - 45 U/L      Comment:      Reference intervals for this test were updated on 6/12/2023 to more accurately reflect our healthy population. There may be differences in the flagging of prior results with similar values performed with this method. Interpretation of those prior results can be made in the context of the updated reference intervals.    ALT 57 0 - 70 U/L       Comment:      Reference intervals for this test were updated on 6/12/2023 to more accurately reflect our healthy population. There may be differences in the flagging of prior results with similar values performed with this method. Interpretation of those prior results can be made in the context of the updated reference intervals.      Protein Total 7.3 6.4 - 8.3 g/dL    Albumin 4.8 3.5 - 5.2 g/dL    Bilirubin Total 0.4 <=1.2 mg/dL       If you have any questions or concerns, please call the clinic at the number listed above.       Sincerely,      Robert He MD

## 2024-05-29 NOTE — NURSING NOTE
"Chief Complaint   Patient presents with    Physical       Initial /86   Pulse 77   Temp 97.9  F (36.6  C) (Temporal)   Resp 16   Ht 1.727 m (5' 8\")   Wt 87.2 kg (192 lb 3.2 oz)   SpO2 98%   BMI 29.22 kg/m   Estimated body mass index is 29.22 kg/m  as calculated from the following:    Height as of this encounter: 1.727 m (5' 8\").    Weight as of this encounter: 87.2 kg (192 lb 3.2 oz).  Medication Review: complete    The next two questions are to help us understand your food security.  If you are feeling you need any assistance in this area, we have resources available to support you today.          1/24/2024   SDOH- Food Insecurity   Within the past 12 months, did you worry that your food would run out before you got money to buy more? N   Within the past 12 months, did the food you bought just not last and you didn t have money to get more? N         Health Care Directive:  Patient does not have a Health Care Directive or Living Will: Discussed advance care planning with patient; however, patient declined at this time.    Reena Johnson LPN      "
Attending and PA/NP shared services statement (NON-critical care):

## 2025-02-04 ENCOUNTER — OFFICE VISIT (OUTPATIENT)
Dept: FAMILY MEDICINE | Facility: OTHER | Age: 50
End: 2025-02-04
Attending: STUDENT IN AN ORGANIZED HEALTH CARE EDUCATION/TRAINING PROGRAM
Payer: COMMERCIAL

## 2025-02-04 VITALS
BODY MASS INDEX: 29.31 KG/M2 | WEIGHT: 193.4 LBS | SYSTOLIC BLOOD PRESSURE: 132 MMHG | DIASTOLIC BLOOD PRESSURE: 82 MMHG | HEART RATE: 92 BPM | OXYGEN SATURATION: 96 % | HEIGHT: 68 IN | TEMPERATURE: 97.5 F

## 2025-02-04 DIAGNOSIS — Z00.00 ROUTINE GENERAL MEDICAL EXAMINATION AT A HEALTH CARE FACILITY: ICD-10-CM

## 2025-02-04 DIAGNOSIS — Z76.89 ENCOUNTER TO ESTABLISH CARE: Primary | ICD-10-CM

## 2025-02-04 DIAGNOSIS — Z13.1 SCREENING FOR DIABETES MELLITUS: ICD-10-CM

## 2025-02-04 DIAGNOSIS — I10 PRIMARY HYPERTENSION: ICD-10-CM

## 2025-02-04 DIAGNOSIS — Z12.5 SCREENING FOR MALIGNANT NEOPLASM OF PROSTATE: ICD-10-CM

## 2025-02-04 DIAGNOSIS — E78.2 MIXED HYPERLIPIDEMIA: ICD-10-CM

## 2025-02-04 DIAGNOSIS — Z00.00 HEALTHCARE MAINTENANCE: ICD-10-CM

## 2025-02-04 LAB
ALBUMIN SERPL BCG-MCNC: 4.6 G/DL (ref 3.5–5.2)
ALP SERPL-CCNC: 68 U/L (ref 40–150)
ALT SERPL W P-5'-P-CCNC: 42 U/L (ref 0–70)
ANION GAP SERPL CALCULATED.3IONS-SCNC: 11 MMOL/L (ref 7–15)
AST SERPL W P-5'-P-CCNC: 31 U/L (ref 0–45)
BASOPHILS # BLD AUTO: 0.1 10E3/UL (ref 0–0.2)
BASOPHILS NFR BLD AUTO: 1 %
BILIRUB SERPL-MCNC: 0.6 MG/DL
BUN SERPL-MCNC: 12.4 MG/DL (ref 6–20)
CALCIUM SERPL-MCNC: 9.5 MG/DL (ref 8.8–10.4)
CHLORIDE SERPL-SCNC: 104 MMOL/L (ref 98–107)
CHOLEST SERPL-MCNC: 152 MG/DL
CREAT SERPL-MCNC: 1.13 MG/DL (ref 0.67–1.17)
EGFRCR SERPLBLD CKD-EPI 2021: 80 ML/MIN/1.73M2
EOSINOPHIL # BLD AUTO: 0.5 10E3/UL (ref 0–0.7)
EOSINOPHIL NFR BLD AUTO: 6 %
ERYTHROCYTE [DISTWIDTH] IN BLOOD BY AUTOMATED COUNT: 11.9 % (ref 10–15)
EST. AVERAGE GLUCOSE BLD GHB EST-MCNC: 114 MG/DL
FASTING STATUS PATIENT QL REPORTED: YES
FASTING STATUS PATIENT QL REPORTED: YES
GLUCOSE SERPL-MCNC: 100 MG/DL (ref 70–99)
HBA1C MFR BLD: 5.6 %
HCO3 SERPL-SCNC: 26 MMOL/L (ref 22–29)
HCT VFR BLD AUTO: 44.4 % (ref 40–53)
HDLC SERPL-MCNC: 37 MG/DL
HGB BLD-MCNC: 15.5 G/DL (ref 13.3–17.7)
IMM GRANULOCYTES # BLD: 0 10E3/UL
IMM GRANULOCYTES NFR BLD: 0 %
LDLC SERPL CALC-MCNC: 82 MG/DL
LYMPHOCYTES # BLD AUTO: 2.9 10E3/UL (ref 0.8–5.3)
LYMPHOCYTES NFR BLD AUTO: 35 %
MCH RBC QN AUTO: 30.2 PG (ref 26.5–33)
MCHC RBC AUTO-ENTMCNC: 34.9 G/DL (ref 31.5–36.5)
MCV RBC AUTO: 86 FL (ref 78–100)
MONOCYTES # BLD AUTO: 0.6 10E3/UL (ref 0–1.3)
MONOCYTES NFR BLD AUTO: 8 %
NEUTROPHILS # BLD AUTO: 4.3 10E3/UL (ref 1.6–8.3)
NEUTROPHILS NFR BLD AUTO: 52 %
NONHDLC SERPL-MCNC: 115 MG/DL
NRBC # BLD AUTO: 0 10E3/UL
NRBC BLD AUTO-RTO: 0 /100
PLATELET # BLD AUTO: 307 10E3/UL (ref 150–450)
POTASSIUM SERPL-SCNC: 4 MMOL/L (ref 3.4–5.3)
PROT SERPL-MCNC: 7.5 G/DL (ref 6.4–8.3)
PSA SERPL DL<=0.01 NG/ML-MCNC: 0.53 NG/ML (ref 0–2.5)
RBC # BLD AUTO: 5.14 10E6/UL (ref 4.4–5.9)
SODIUM SERPL-SCNC: 141 MMOL/L (ref 135–145)
TRIGL SERPL-MCNC: 166 MG/DL
WBC # BLD AUTO: 8.4 10E3/UL (ref 4–11)

## 2025-02-04 RX ORDER — LISINOPRIL 10 MG/1
10 TABLET ORAL
Qty: 90 TABLET | Refills: 3 | Status: SHIPPED | OUTPATIENT
Start: 2025-02-04

## 2025-02-04 RX ORDER — ROSUVASTATIN CALCIUM 10 MG/1
10 TABLET, COATED ORAL
Qty: 90 TABLET | Refills: 3 | Status: SHIPPED | OUTPATIENT
Start: 2025-02-04

## 2025-02-04 RX ORDER — LISINOPRIL 10 MG/1
10 TABLET ORAL
Qty: 90 TABLET | Refills: 4 | Status: SHIPPED | OUTPATIENT
Start: 2025-02-04 | End: 2025-02-04

## 2025-02-04 RX ORDER — ROSUVASTATIN CALCIUM 10 MG/1
10 TABLET, COATED ORAL
Qty: 90 TABLET | Refills: 4 | Status: SHIPPED | OUTPATIENT
Start: 2025-02-04 | End: 2025-02-04

## 2025-02-04 ASSESSMENT — PAIN SCALES - GENERAL: PAINLEVEL_OUTOF10: NO PAIN (0)

## 2025-02-04 NOTE — PROGRESS NOTES
Assessment & Plan     Encounter to establish care  - Previously followed with Dr. He at [facility]  - Medical history is no medication is reviewed and updated  - Updating annual healthcare maintenance as below    Routine general medical examination at a health care facility  - CBC with platelets and differential  - Comprehensive metabolic panel (BMP + Alb, Alk Phos, ALT, AST, Total. Bili, TP)  - Hemoglobin A1c  - Lipid Profile (Chol, Trig, HDL, LDL calc)  - PSA, screen  - Preventative screening guidelines provided in AVS  - Return 1 year for annual physical    Healthcare maintenance  - BP/vitals: Reviewed, normal  - BMI: Body mass index is 29.41 kg/m .; discussed healthy diet and excise recommendations  - ASCVD risk screening: Annual A1c/lipid screen.  Discussed risk mitigation including indications for ASA/statin therapy.   The 10-year ASCVD risk score (Nola GLORIA, et al., 2019) is: 4.7%    Values used to calculate the score:      Age: 49 years      Sex: Male      Is Non- : No      Diabetic: No      Tobacco smoker: No      Systolic Blood Pressure: 132 mmHg      Is BP treated: Yes      HDL Cholesterol: 34 mg/dL      Total Cholesterol: 171 mg/dL  - Mood: Denies concerns.      2/4/2025     9:34 AM 1/24/2024    12:26 AM   PHQ-2 ( 1999 Pfizer)   Q1: Little interest or pleasure in doing things 0 0   Q2: Feeling down, depressed or hopeless 0 0   PHQ-2 Score 0  0   Q1: Little interest or pleasure in doing things Not at all Not at all   Q2: Feeling down, depressed or hopeless Not at all Not at all   PHQ-2 Score 0 0       Patient-reported   - Tobacco/substance screening: No tobacco illicit substance use     Tobacco Use      Smoking status: Never      Smokeless tobacco: Never  - Infectious disease screening: Low risk;  - Cancer screening:              -Family history:   -Lung: n/a   -Colon: Cologuard 2/8/23; repeat 3 years, tentatively discussed switching to colonoscopy at that time   -Prostate:  "Has already been following PSAs, will do annual surveillance today  - Immunizations: Declines COVID shot    Screening for diabetes mellitus  - Hemoglobin A1c    Mixed hyperlipidemia  Has been well-controlled on statin therapy, update annual lipid profile today.  - rosuvastatin (CRESTOR) 10 MG tablet; Take 1 tablet (10 mg) by mouth daily at 2 pm.  Dispense: 90 tablet; Refill: 3  - Lipid Profile (Chol, Trig, HDL, LDL calc)    Primary hypertension  Stable.  Refill lisinopril today.  - lisinopril (ZESTRIL) 10 MG tablet; Take 1 tablet (10 mg) by mouth daily at 2 pm.  Dispense: 90 tablet; Refill: 3  - CBC with platelets and differential  - Comprehensive metabolic panel (BMP + Alb, Alk Phos, ALT, AST, Total. Bili, TP)    Screening for malignant neoplasm of prostate  - PSA, screen    BMI  Estimated body mass index is 29.41 kg/m  as calculated from the following:    Height as of this encounter: 1.727 m (5' 8\").    Weight as of this encounter: 87.7 kg (193 lb 6.4 oz).   Weight management plan: Discussed healthy diet and exercise guidelines    I spent a total of 30 minutes on the day of the visit.   Time spent by me today doing chart review, history and exam, documentation and further activities per the note    The longitudinal plan of care for the diagnosis(es)/condition(s) as documented were addressed during this visit. Due to the added complexity in care, I will continue to support AMBROSE in the subsequent management and with ongoing continuity of care.    Follow-up 1 year for annual physical or sooner as needed.    Subjective   AMBROSE is a 49 year old, presenting for the following health issues:  Establish Care and Hypertension    HPI     Establish care  -Previously followed with Dr. He at [facility]  -Due for annual physical, med refills  -No acute concerns    Hypertension Follow-up  Do you check your blood pressure regularly outside of the clinic? Yes   Are you following a low salt diet? Yes  Are your blood pressures ever more " "than 140 on the top number (systolic) OR more   than 90 on the bottom number (diastolic), for example 140/90? No    Hyperlipidemia  -LDL has been well-controlled on statin  -Does have persistently low HDL lab  -Tolerates statin without side effect      Review of Systems  Constitutional, HEENT, cardiovascular, pulmonary, gi and gu systems are negative, except as otherwise noted.      Objective    /82 (BP Location: Right arm, Patient Position: Sitting, Cuff Size: Adult Large)   Pulse 92   Temp 97.5  F (36.4  C) (Tympanic)   Ht 1.727 m (5' 8\")   Wt 87.7 kg (193 lb 6.4 oz)   SpO2 96%   BMI 29.41 kg/m    Body mass index is 29.41 kg/m .  Physical Exam   GENERAL: alert and no distress  EYES: Eyes grossly normal to inspection, PERRL and conjunctivae and sclerae normal  HENT: ear canals and TM's normal, nose and mouth without ulcers or lesions  NECK: no adenopathy, no asymmetry, masses, or scars  RESP: lungs clear to auscultation - no rales, rhonchi or wheezes  CV: regular rate and rhythm, normal S1 S2, no S3 or S4, no murmur, click or rub, no peripheral edema  ABDOMEN: soft, nontender, no hepatosplenomegaly, no masses and bowel sounds normal  MS: no gross musculoskeletal defects noted, no edema  SKIN: no suspicious lesions or rashes  NEURO: Normal strength and tone, mentation intact and speech normal  PSYCH: mentation appears normal, affect normal/bright        Signed Electronically by: Christopher Carter MD    "

## 2025-02-04 NOTE — PATIENT INSTRUCTIONS
Patient Education   Preventive Care Advice   This is general advice given by our system to help you stay healthy. However, your care team may have specific advice just for you. Please talk to your care team about your preventive care needs.  Nutrition  Eat 5 or more servings of fruits and vegetables each day.  Try wheat bread, brown rice and whole grain pasta (instead of white bread, rice, and pasta).  Get enough calcium and vitamin D. Check the label on foods and aim for 100% of the RDA (recommended daily allowance).  Lifestyle  Exercise at least 150 minutes each week  (30 minutes a day, 5 days a week).  Do muscle strengthening activities 2 days a week. These help control your weight and prevent disease.  No smoking.  Wear sunscreen to prevent skin cancer.  Have a dental exam and cleaning every 6 months.  Yearly exams  See your health care team every year to talk about:  Any changes in your health.  Any medicines your care team has prescribed.  Preventive care, family planning, and ways to prevent chronic diseases.  Shots (vaccines)   HPV shots (up to age 26), if you've never had them before.  Hepatitis B shots (up to age 59), if you've never had them before.  COVID-19 shot: Get this shot when it's due.  Flu shot: Get a flu shot every year.  Tetanus shot: Get a tetanus shot every 10 years.  Pneumococcal, hepatitis A, and RSV shots: Ask your care team if you need these based on your risk.  Shingles shot (for age 50 and up)  General health tests  Diabetes screening:  Starting at age 35, Get screened for diabetes at least every 3 years.  If you are younger than age 35, ask your care team if you should be screened for diabetes.  Cholesterol test: At age 39, start having a cholesterol test every 5 years, or more often if advised.  Bone density scan (DEXA): At age 50, ask your care team if you should have this scan for osteoporosis (brittle bones).  Hepatitis C: Get tested at least once in your life.  STIs (sexually  transmitted infections)  Before age 24: Ask your care team if you should be screened for STIs.  After age 24: Get screened for STIs if you're at risk. You are at risk for STIs (including HIV) if:  You are sexually active with more than one person.  You don't use condoms every time.  You or a partner was diagnosed with a sexually transmitted infection.  If you are at risk for HIV, ask about PrEP medicine to prevent HIV.  Get tested for HIV at least once in your life, whether you are at risk for HIV or not.  Cancer screening tests  Cervical cancer screening: If you have a cervix, begin getting regular cervical cancer screening tests starting at age 21.  Breast cancer scan (mammogram): If you've ever had breasts, begin having regular mammograms starting at age 40. This is a scan to check for breast cancer.  Colon cancer screening: It is important to start screening for colon cancer at age 45.  Have a colonoscopy test every 10 years (or more often if you're at risk) Or, ask your provider about stool tests like a FIT test every year or Cologuard test every 3 years.  To learn more about your testing options, visit:   .  For help making a decision, visit:   https://bit.ly/nd15263.  Prostate cancer screening test: If you have a prostate, ask your care team if a prostate cancer screening test (PSA) at age 55 is right for you.  Lung cancer screening: If you are a current or former smoker ages 50 to 80, ask your care team if ongoing lung cancer screenings are right for you.  For informational purposes only. Not to replace the advice of your health care provider. Copyright   2023 [employer]. All rights reserved. Clinically reviewed by the [facility] Program. APJeT 611792 - REV 01/24.

## 2025-03-19 DIAGNOSIS — I10 BENIGN ESSENTIAL HYPERTENSION: Primary | ICD-10-CM

## 2025-03-19 RX ORDER — LOSARTAN POTASSIUM 25 MG/1
25 TABLET ORAL DAILY
Qty: 90 TABLET | Refills: 4 | Status: SHIPPED | OUTPATIENT
Start: 2025-03-19